# Patient Record
Sex: FEMALE | Race: WHITE | NOT HISPANIC OR LATINO | Employment: FULL TIME | URBAN - METROPOLITAN AREA
[De-identification: names, ages, dates, MRNs, and addresses within clinical notes are randomized per-mention and may not be internally consistent; named-entity substitution may affect disease eponyms.]

---

## 2022-12-14 ENCOUNTER — HOSPITAL ENCOUNTER (EMERGENCY)
Facility: CLINIC | Age: 27
Discharge: HOME | End: 2022-12-14
Attending: EMERGENCY MEDICINE | Admitting: EMERGENCY MEDICINE

## 2022-12-14 ENCOUNTER — APPOINTMENT (OUTPATIENT)
Dept: GENERAL RADIOLOGY | Facility: CLINIC | Age: 27
End: 2022-12-14
Attending: EMERGENCY MEDICINE

## 2022-12-14 VITALS
HEART RATE: 96 BPM | TEMPERATURE: 98 F | OXYGEN SATURATION: 100 % | DIASTOLIC BLOOD PRESSURE: 90 MMHG | SYSTOLIC BLOOD PRESSURE: 152 MMHG | RESPIRATION RATE: 20 BRPM

## 2022-12-14 DIAGNOSIS — J40 BRONCHITIS: ICD-10-CM

## 2022-12-14 DIAGNOSIS — R07.89 OTHER CHEST PAIN: ICD-10-CM

## 2022-12-14 LAB
ANION GAP SERPL CALCULATED.3IONS-SCNC: 13 MMOL/L (ref 7–15)
ATRIAL RATE - MUSE: 84 BPM
BUN SERPL-MCNC: 11.2 MG/DL (ref 6–20)
CALCIUM SERPL-MCNC: 9 MG/DL (ref 8.6–10)
CHLORIDE SERPL-SCNC: 104 MMOL/L (ref 98–107)
CREAT SERPL-MCNC: 0.72 MG/DL (ref 0.51–0.95)
DEPRECATED HCO3 PLAS-SCNC: 21 MMOL/L (ref 22–29)
DIASTOLIC BLOOD PRESSURE - MUSE: NORMAL MMHG
ERYTHROCYTE [DISTWIDTH] IN BLOOD BY AUTOMATED COUNT: 13.2 % (ref 10–15)
GFR SERPL CREATININE-BSD FRML MDRD: >90 ML/MIN/1.73M2
GLUCOSE SERPL-MCNC: 104 MG/DL (ref 70–99)
HCG INTACT+B SERPL-ACNC: <1 MIU/ML
HCT VFR BLD AUTO: 42.4 % (ref 35–47)
HGB BLD-MCNC: 13.8 G/DL (ref 11.7–15.7)
HOLD SPECIMEN: NORMAL
HOLD SPECIMEN: NORMAL
INTERPRETATION ECG - MUSE: NORMAL
MCH RBC QN AUTO: 29.7 PG (ref 26.5–33)
MCHC RBC AUTO-ENTMCNC: 32.5 G/DL (ref 31.5–36.5)
MCV RBC AUTO: 91 FL (ref 78–100)
P AXIS - MUSE: 23 DEGREES
PLATELET # BLD AUTO: 225 10E3/UL (ref 150–450)
POTASSIUM SERPL-SCNC: 4.2 MMOL/L (ref 3.4–5.3)
PR INTERVAL - MUSE: 118 MS
QRS DURATION - MUSE: 82 MS
QT - MUSE: 334 MS
QTC - MUSE: 394 MS
R AXIS - MUSE: 41 DEGREES
RBC # BLD AUTO: 4.65 10E6/UL (ref 3.8–5.2)
SODIUM SERPL-SCNC: 138 MMOL/L (ref 136–145)
SYSTOLIC BLOOD PRESSURE - MUSE: NORMAL MMHG
T AXIS - MUSE: 13 DEGREES
TROPONIN T SERPL HS-MCNC: <6 NG/L
VENTRICULAR RATE- MUSE: 84 BPM
WBC # BLD AUTO: 5.9 10E3/UL (ref 4–11)

## 2022-12-14 PROCEDURE — 84484 ASSAY OF TROPONIN QUANT: CPT | Performed by: EMERGENCY MEDICINE

## 2022-12-14 PROCEDURE — 99285 EMERGENCY DEPT VISIT HI MDM: CPT | Mod: 25

## 2022-12-14 PROCEDURE — 36415 COLL VENOUS BLD VENIPUNCTURE: CPT | Performed by: EMERGENCY MEDICINE

## 2022-12-14 PROCEDURE — 80048 BASIC METABOLIC PNL TOTAL CA: CPT | Performed by: EMERGENCY MEDICINE

## 2022-12-14 PROCEDURE — 84702 CHORIONIC GONADOTROPIN TEST: CPT | Performed by: EMERGENCY MEDICINE

## 2022-12-14 PROCEDURE — 250N000011 HC RX IP 250 OP 636: Performed by: EMERGENCY MEDICINE

## 2022-12-14 PROCEDURE — 71046 X-RAY EXAM CHEST 2 VIEWS: CPT

## 2022-12-14 PROCEDURE — 93005 ELECTROCARDIOGRAM TRACING: CPT

## 2022-12-14 PROCEDURE — 96374 THER/PROPH/DIAG INJ IV PUSH: CPT

## 2022-12-14 PROCEDURE — 85014 HEMATOCRIT: CPT | Performed by: EMERGENCY MEDICINE

## 2022-12-14 PROCEDURE — 85027 COMPLETE CBC AUTOMATED: CPT | Performed by: EMERGENCY MEDICINE

## 2022-12-14 RX ORDER — KETOROLAC TROMETHAMINE 15 MG/ML
15 INJECTION, SOLUTION INTRAMUSCULAR; INTRAVENOUS ONCE
Status: COMPLETED | OUTPATIENT
Start: 2022-12-14 | End: 2022-12-14

## 2022-12-14 RX ADMIN — KETOROLAC TROMETHAMINE 15 MG: 15 INJECTION, SOLUTION INTRAMUSCULAR; INTRAVENOUS at 07:26

## 2022-12-14 ASSESSMENT — ACTIVITIES OF DAILY LIVING (ADL): ADLS_ACUITY_SCORE: 33

## 2022-12-14 NOTE — ED PROVIDER NOTES
History     Chief Complaint:    Chest Pain      HPI   Jesus Joel is a 27 year old female who presents with left-sided chest and breast pain starting last night at 2030 hrs.  The patient says she has had previous episodes of this without triggers describes the pain as an ache normally last for shorter period of time but has been constant since last night.  She has had a cough.  She says that when she turns her head in any direction the pain gets worse or when she coughs it gets worse.  She denies any other associated symptoms like shortness of breath fevers chills she has had a cough that is nonproductive.  No bowel or bladder problems nausea headache no recent trauma.  She did say when she was younger she was playing basketball was told there was scar tissue on her chest but on the right side.  The patient said she was concerned how long its been lasting is not any for the pain.  No recent travel.    Review of Systems  10 point review of systems all negative except as in HPI    Allergies:    Penicillins      Medications:      No current outpatient medications on file.      Past Medical History:    Chest wall injury with scar tissue      Family History:    Patient denies blood clots    Social History:  No tobacco use  No primary care provider on file.      Physical Exam     Patient Vitals for the past 24 hrs:   BP Temp Pulse Resp SpO2   12/14/22 0459 (!) 152/90 98  F (36.7  C) -- -- --   12/14/22 0458 -- -- 96 20 100 %       Physical Exam  General: The patient is alert, in no respiratory distress.    HENT: Mucous membranes moist.    Cardiovascular: Regular rate and rhythm. Good pulses in all four extremities. Normal capillary refill and skin turgor.     Respiratory: Lungs are clear. No nasal flaring. No retractions. No wheezing, no crackles.    Gastrointestinal: Abdomen soft. No guarding, no rebound. No palpable hernias.     Musculoskeletal: No gross deformity.  Patient reports that the pain is recreated when I  passively or actively she rotates or moves her neck.  There is no meningismus.    Skin: No rashes or petechiae.     Neurologic: The patient is alert and oriented x3. GCS 15. No testable cranial nerve deficit. Follows commands with clear and appropriate speech. Gives appropriate answers. Good strength in all extremities. No gross neurologic deficit. Gross sensation intact. Pupils are round and reactive. No meningismus.     Lymphatic: No cervical adenopathy. No lower extremity swelling.    Psychiatric: The patient is non-tearful.    Emergency Department Course   ECG:  No old EKGs  Ventricular of 84.  118 QRS duration of 82 and a QTC of 394.  Other than J-point elevation there is no signs of ST depression or pathologic ST elevation.  Normal sinus rhythm    ECG results from 12/14/22   EKG 12 lead     Value    Systolic Blood Pressure     Diastolic Blood Pressure     Ventricular Rate 84    Atrial Rate 84    NH Interval 118    QRS Duration 82        QTc 394    P Axis 23    R AXIS 41    T Axis 13    Interpretation ECG      Sinus rhythm  Normal ECG  No previous ECGs available         Imaging:  Chest XR,  PA & LAT   Final Result   IMPRESSION: Negative chest.        Report per radiology    Laboratory:  Labs Ordered and Resulted from Time of ED Arrival to Time of ED Departure   BASIC METABOLIC PANEL - Abnormal       Result Value    Sodium 138      Potassium 4.2      Chloride 104      Carbon Dioxide (CO2) 21 (*)     Anion Gap 13      Urea Nitrogen 11.2      Creatinine 0.72      Calcium 9.0      Glucose 104 (*)     GFR Estimate >90     CBC WITH PLATELETS - Normal    WBC Count 5.9      RBC Count 4.65      Hemoglobin 13.8      Hematocrit 42.4      MCV 91      MCH 29.7      MCHC 32.5      RDW 13.2      Platelet Count 225     TROPONIN T, HIGH SENSITIVITY - Normal    Troponin T, High Sensitivity <6     HCG QUANTITATIVE PREGNANCY - Normal    hCG Quantitative <1         Procedures:      Emergency Department Course:              Reviewed:    I reviewed nursing notes and vitals    Assessments:   I obtained history and examined the patient as noted above.       Consults:            Interventions:    Medications   ketorolac (TORADOL) injection 15 mg (has no administration in time range)       Disposition:  The patient was discharged to home.     Impression & Plan        Medical Decision Making:  The patient has a low pretest probability for ACS.  The fact that she has had similar pain in the setting of a previous chest wall injury leading to think of pneumothorax bleb.  I did consider alternative diagnoses such as PE but felt she was low risk she is not pregnant not on hormones.  The fact the patient has an associated cough let me think this could be bronchitis chest wall pain GERD.  She definitely says she felt pain in the breast.  With moving her neck there is no signs of stiffness to suggest meningitis and no fever.  I felt more likely this indicates probably the chest wall.  GERD was considered but felt to be less likely.  Her intra-abdominal exam was benign and I do not think this is pancreatitis diverticulitis or other significant cause that way.  Dissection was considered but is unlikely as well.  Patient with Toradol and anti-inflammatories I discussed Use of ibuprofen.  I did stress you to follow-up with a primary care doctor was discharged home in good condition.      Covid-19  Jesus Joel was evaluated during a global COVID-19 pandemic, which necessitated consideration that the patient might be at risk for infection with the SARS-CoV-2 virus that causes COVID-19.   Applicable protocols for evaluation were followed during the patient's care.   COVID-19 was considered as part of the patient's evaluation.    Diagnosis:    ICD-10-CM    1. Other chest pain  R07.89       2. Bronchitis  J40           Discharge Medications:  New Prescriptions    No medications on file              Eduard Cantu MD  12/14/22 0703

## 2022-12-14 NOTE — ED TRIAGE NOTES
Pt arrives to ED with CP since 2030. Pt states she was able to sleep but pain was still there in the morning. Pain is left upper chest and under her L breast. No meds PTA.

## 2023-08-25 ENCOUNTER — HOSPITAL ENCOUNTER (EMERGENCY)
Facility: CLINIC | Age: 28
Discharge: HOME | End: 2023-08-25
Attending: EMERGENCY MEDICINE | Admitting: EMERGENCY MEDICINE

## 2023-08-25 VITALS
HEART RATE: 78 BPM | TEMPERATURE: 97.3 F | RESPIRATION RATE: 18 BRPM | DIASTOLIC BLOOD PRESSURE: 90 MMHG | SYSTOLIC BLOOD PRESSURE: 141 MMHG | OXYGEN SATURATION: 98 %

## 2023-08-25 DIAGNOSIS — M54.17 LUMBOSACRAL RADICULOPATHY: ICD-10-CM

## 2023-08-25 DIAGNOSIS — M54.41 ACUTE BILATERAL LOW BACK PAIN WITH RIGHT-SIDED SCIATICA: ICD-10-CM

## 2023-08-25 PROCEDURE — 99284 EMERGENCY DEPT VISIT MOD MDM: CPT

## 2023-08-25 RX ORDER — LIDOCAINE 50 MG/G
1 PATCH TOPICAL EVERY 24 HOURS
Qty: 7 PATCH | Refills: 0 | Status: SHIPPED | OUTPATIENT
Start: 2023-08-25 | End: 2023-09-01

## 2023-08-25 RX ORDER — CYCLOBENZAPRINE HCL 10 MG
10 TABLET ORAL 3 TIMES DAILY PRN
Qty: 12 TABLET | Refills: 0 | Status: SHIPPED | OUTPATIENT
Start: 2023-08-25 | End: 2023-08-31

## 2023-08-25 RX ORDER — METHYLPREDNISOLONE 4 MG
TABLET, DOSE PACK ORAL
Qty: 21 TABLET | Refills: 0 | Status: SHIPPED | OUTPATIENT
Start: 2023-08-25

## 2023-08-25 RX ORDER — IBUPROFEN 800 MG/1
800 TABLET, FILM COATED ORAL EVERY 8 HOURS PRN
Qty: 15 TABLET | Refills: 0 | Status: SHIPPED | OUTPATIENT
Start: 2023-08-25 | End: 2023-08-30

## 2023-08-25 ASSESSMENT — ACTIVITIES OF DAILY LIVING (ADL): ADLS_ACUITY_SCORE: 33

## 2023-08-25 NOTE — ED TRIAGE NOTES
Mid lower back pain that started last night. Has had it before, gone away without intervention. This pain is persistent. Tender to palpation. Pain radiates to right knee. CMS intact in bilateral lower extremities. No loss of bowel or bladder.

## 2023-08-26 NOTE — ED PROVIDER NOTES
History     Chief Complaint:  Back Pain     The history is provided by the patient.      Jesus Joel is a 28 year old female who presents to the ED for evaluation of back pain. The patient reports that she has had lower midline back pain for the past 2 days. States that her back was stiff when she gets out of bed, but now she has numbness and tingling going down her right leg. Reports that it feels like if she moves the wrong way, the pain shoots down her right leg. Patient has had back pain before and her last flare up was 4 months ago, but it was not this bad and it went away on its own. States she was feeling her back and it felt like something in her back was moving. Denies abdominal pain, fever, urinary symptoms, bowel symptoms, right foot pain, chance of pregnancy, or history of IV drug use.    Independent Historian:   None - Patient Only    Review of External Notes:        Medications:    The patient is not currently taking any prescribed medications.     Past Medical History:    The patient has no pertinent past medical history.     Past Surgical History:    The patient has no pertinent past surgical history.     Physical Exam   Patient Vitals for the past 24 hrs:   BP Temp Temp src Pulse Resp SpO2   08/25/23 1932 (!) 141/90 -- -- 78 18 98 %   08/25/23 1754 139/87 97.3  F (36.3  C) Temporal 94 16 99 %      Physical Exam  CV: ppi, regular   Resp: speaking in full sentences with any resp distress  Abd: soft  Back: No TTP midline C/T/L spine, bilateral lower lumbar spinal tenderness palpation) paraspinous muscle TTP  Skin: warm, dry, well perused, no rashes/bruising/lesions on exposed skin  Neuro: alert, follows commands, speech clear, strength 5/5 and symmetric, SILT in BUE  BLE              5/5 Strength Thigh Flex/Ext, Leg flex/Ext, PF/DF/EHL              SILT all 5 dermatomes BLE              Normal muscular tone              Positive straight leg raise right lower extremity at 60 degrees               Gait stable     Psych: normal mood and affect    Emergency Department Course   Emergency Department Course & Assessments:  Assessments/Consultations/Discussion of Management or Tests:  ED Course as of 08/26/23 0026   Fri Aug 25, 2023   1916 I obtained history and examined the patient as noted above.      Social Determinants of Health affecting care:   None    Disposition:  The patient was discharged to home.     Impression & Plan    Medical Decision Making:  This patient presented with back pain. The patient did not sustain any focal trauma, therefore x-rays are not necessary due to the low likelihood of fracture or subluxation. The patient has not had a fever, saddle/perineal anesthesia, objective motor or sensory loss, or bowel or bladder dysfunction.  There is no clinical evidence of cauda equina syndrome, discitis, spinal/epidural space hematoma or abscess.     The neurological exam is normal and the patient's symptoms are consistent with a sciatica/lumbosacral radiculopathy. myelopathy. The patient will be discharged with pain medications to use as directed.           Diagnosis:    ICD-10-CM    1. Lumbosacral radiculopathy  M54.17       2. Acute bilateral low back pain with right-sided sciatica  M54.41          Discharge Medications:  Discharge Medication List as of 8/25/2023  7:27 PM        START taking these medications    Details   cyclobenzaprine (FLEXERIL) 10 MG tablet Take 1 tablet (10 mg) by mouth 3 times daily as needed for muscle spasms, Disp-12 tablet, R-0, E-Prescribe      ibuprofen (ADVIL/MOTRIN) 800 MG tablet Take 1 tablet (800 mg) by mouth every 8 hours as needed for moderate pain, Disp-15 tablet, R-0, E-Prescribe      lidocaine (LIDODERM) 5 % patch Place 1 patch onto the skin every 24 hours for 7 days To prevent lidocaine toxicity, patient should be patch free for 12 hrs daily.Disp-7 patch, [id number]      methylPREDNISolone (MEDROL DOSEPAK) 4 MG tablet therapy pack Follow package  instructions, Disp-21 tablet, R-0, E-Prescribe            Scribe Disclosure:  I, CLINTON GANT, am serving as a scribe at 7:19 PM on 8/25/2023 to document services personally performed by Maco Flores MD based on my observations and the provider's statements to me.     8/25/2023   Maco Flores MD Walker, Jerome Richard, MD  08/26/23 0028

## 2024-05-19 ENCOUNTER — HOSPITAL ENCOUNTER (EMERGENCY)
Facility: CLINIC | Age: 29
Discharge: HOME | End: 2024-05-19
Attending: STUDENT IN AN ORGANIZED HEALTH CARE EDUCATION/TRAINING PROGRAM | Admitting: STUDENT IN AN ORGANIZED HEALTH CARE EDUCATION/TRAINING PROGRAM
Payer: COMMERCIAL

## 2024-05-19 VITALS
BODY MASS INDEX: 28.52 KG/M2 | OXYGEN SATURATION: 100 % | WEIGHT: 160.94 LBS | RESPIRATION RATE: 17 BRPM | TEMPERATURE: 97.3 F | HEART RATE: 99 BPM | HEIGHT: 63 IN

## 2024-05-19 DIAGNOSIS — H61.21 IMPACTED CERUMEN OF RIGHT EAR: ICD-10-CM

## 2024-05-19 PROCEDURE — 69209 REMOVE IMPACTED EAR WAX UNI: CPT | Mod: RT

## 2024-05-19 PROCEDURE — 99283 EMERGENCY DEPT VISIT LOW MDM: CPT

## 2024-05-19 ASSESSMENT — COLUMBIA-SUICIDE SEVERITY RATING SCALE - C-SSRS
6. HAVE YOU EVER DONE ANYTHING, STARTED TO DO ANYTHING, OR PREPARED TO DO ANYTHING TO END YOUR LIFE?: NO
1. IN THE PAST MONTH, HAVE YOU WISHED YOU WERE DEAD OR WISHED YOU COULD GO TO SLEEP AND NOT WAKE UP?: NO
2. HAVE YOU ACTUALLY HAD ANY THOUGHTS OF KILLING YOURSELF IN THE PAST MONTH?: NO

## 2024-05-19 ASSESSMENT — ACTIVITIES OF DAILY LIVING (ADL): ADLS_ACUITY_SCORE: 33

## 2024-05-19 NOTE — DISCHARGE INSTRUCTIONS
Follow-up with primary care for ear recheck as needed  Return here or present to urgent care if you develop ear pain, fevers, persistent ear drainage, or further emergent concerns

## 2024-05-19 NOTE — ED PROVIDER NOTES
"  Emergency Department Note      History of Present Illness     Chief Complaint  Ear Fullness    HPI  Jesus Joel is a 29 year old female presenting for evaluation of ear fullness. Jesus reports right ear fullness, decreased hearing, and soreness to touch since swimming in a pool two days ago.  She tried to clear the earwax with a Q-tip, but thinks this may have made it worse.  She denies fevers, chills, cough, or pharyngitis. She denies a history of ear tubes or diabetes.    Independent Historian  None    Review of External Notes  None    Past Medical History   Medical History and Problem List  The patient has no known pertinent past medical history.    Medications  Methylprednisolone    Physical Exam   Patient Vitals for the past 24 hrs:   Temp Temp src Pulse Resp SpO2 Height Weight   05/19/24 1018 97.3  F (36.3  C) Temporal (!) 125 17 100 % 1.6 m (5' 3\") 73 kg (160 lb 15 oz)     Physical Exam  Vital signs and nursing notes reviewed.    General:  Alert and oriented, no acute distress. Resting on chair with friend at bedside  Skin: Skin is warm and dry. No rashes, lesions, or erythema. No diaphoresis.  HEENT:   Head: Normocephalic, atraumatic. Facial features symmetric.   Eyes: Conjunctiva pink, sclera white. EOMs grossly intact.   Ears: Auricles without lesion, erythema, or edema.  Left ear canal with mild cerumen, visualized tympanic membrane without erythema or bulging.  Right ear canal completely impacted with cerumen.  After removal, tympanic membrane is pearly gray without erythema or bulging.  Nose: Symmetric with no discharge.  Mouth and throat: Lips are moist with no lesions or edema, Buccal and oropharyngeal mucosa is pink and moist without lesions or exudate. Uvula is midline.  Neck: Normal range of motion.   CV:  Heart RRR with no murmurs or extra heart sounds.   Pulm/Chest: Chest wall expansion symmetric with no increased effort of breathing. Lungs clear and equal to auscultation bilaterally. "   M/S: Moves all extremities spontaneously.      Diagnostics   Independent Interpretation  None    ED Course    Discussion of Management  None    Social Determinants of Health adding to complexity of care  None    ED Course  ED Course as of 05/19/24 1122   Sun May 19, 2024   1036 I initially assessed the patient and obtained the above history and physical exam.   1117 I reassessed the patient and updated them on results and plan of care.        Medical Decision Making / Diagnosis   MIPS     None    MDM  Jesus Joel is a 29 year old female who presents to the ED with sensation of right ear fullness.  See HPI.  Initially mildly tachycardic but remainder vital signs are normal.  On exam, she is well-appearing and nontoxic.  Her affected ear is completely impacted with cerumen.  Nursing staff was able to irrigate this with significant output of earwax.  Patient had immediate relief and resolution of symptoms.  On otoscope exam after removal, patient's tympanic membrane is without erythema or bulging, no evidence of perforation, otitis media, or otitis externa.  No evidence of mastoiditis.  Patient denies any other URI symptoms and therefore viral testing is not indicated at this time.  She is tolerating oral intake, her lungs are clear to auscultation, and she is afebrile.  Using reasonable clinical judgment, I feel the patient is safe for discharge home.  I recommended follow-up with primary care as needed and avoiding Q-tips in the future.  Patient agreeable to plan and had questions answered.    Disposition  The patient was discharged.     ICD-10 Codes:    ICD-10-CM    1. Impacted cerumen of right ear  H61.21         Scribe Disclosure:  Freddy RAYMOND, am serving as a scribe at 10:27 AM on 5/19/2024 to document services personally performed by Sofy Velasco PA-C based on my observations and the provider's statements to me.     Sofy Velasco PA-C on 5/19/2024 at 7:20 PM         Sofy Velasco  JOSÉ MIGUEL  05/19/24 1920

## 2024-05-19 NOTE — ED TRIAGE NOTES
"Pt arrives with \"water in my ear\".  Pt states that she was swimming in a pool on Friday and her ear has felt full since then.  She denies any dizziness but states that it hurts when she touches it.       Triage Assessment (Adult)       Row Name 05/19/24 1017          Triage Assessment    Airway WDL WDL        Respiratory WDL    Respiratory WDL WDL        Cardiac WDL    Cardiac WDL WDL                     "